# Patient Record
Sex: MALE | Race: WHITE | NOT HISPANIC OR LATINO | Employment: FULL TIME | URBAN - METROPOLITAN AREA
[De-identification: names, ages, dates, MRNs, and addresses within clinical notes are randomized per-mention and may not be internally consistent; named-entity substitution may affect disease eponyms.]

---

## 2020-05-01 ENCOUNTER — HOSPITAL ENCOUNTER (EMERGENCY)
Facility: HOSPITAL | Age: 45
Discharge: HOME | End: 2020-05-01
Attending: EMERGENCY MEDICINE | Admitting: EMERGENCY MEDICINE

## 2020-05-01 VITALS
OXYGEN SATURATION: 97 % | HEIGHT: 72 IN | RESPIRATION RATE: 17 BRPM | HEART RATE: 75 BPM | TEMPERATURE: 97.5 F | SYSTOLIC BLOOD PRESSURE: 151 MMHG | DIASTOLIC BLOOD PRESSURE: 95 MMHG

## 2020-05-01 DIAGNOSIS — R60.0 PERIORBITAL EDEMA OF RIGHT EYE: Primary | ICD-10-CM

## 2020-05-01 PROCEDURE — 99282 EMERGENCY DEPT VISIT SF MDM: CPT

## 2020-05-01 NOTE — ED PROVIDER NOTES
EMERGENCY DEPARTMENT ENCOUNTER    Room Number:  04/04  Date seen:  5/1/2020  Time seen: 12:13 PM  PCP: Provider, No Known  Historian: patient      HPI:  Chief Complaint: right eye swelling    A complete HPI/ROS/PMH/PSH/SH/FH are unobtainable due to: none    Context: Morales Matamoros is a 44 y.o. male who presents to the ED for evaluation of swelling to the right eye area.  He states 1 week ago he developed an abscess/cellulitis to his right forehead.  He was seen at urgent care and placed on Keflex and Benadryl.  Since that time the abscess, which he states originally was 5 cm as measured by the urgent care, has nearly completely resolved.  Yesterday he started noticing some swelling to the right periorbital area.  It has gradually worsened over time and was worse when he woke up this morning.  Nothing he has noted specifically seems to make it worse or better.  He denies any pain in the right periorbital area as well as any change in vision, fevers or vomiting.  He is not diabetic.        PAST MEDICAL HISTORY  Active Ambulatory Problems     Diagnosis Date Noted   • No Active Ambulatory Problems     Resolved Ambulatory Problems     Diagnosis Date Noted   • No Resolved Ambulatory Problems     No Additional Past Medical History         PAST SURGICAL HISTORY  No past surgical history on file.      FAMILY HISTORY  No family history on file.      SOCIAL HISTORY  Social History     Socioeconomic History   • Marital status:      Spouse name: Not on file   • Number of children: Not on file   • Years of education: Not on file   • Highest education level: Not on file         ALLERGIES  Patient has no known allergies.        REVIEW OF SYSTEMS  Review of Systems   Constitutional: Negative for chills and fever.   HENT: Negative for sore throat.    Eyes: Negative for pain and visual disturbance.        + right eye swelling   Respiratory: Negative for cough and shortness of breath.    Cardiovascular: Negative for chest pain.    Gastrointestinal: Negative for abdominal pain.   Musculoskeletal: Negative.    Skin:        + resolving cellulitis to forehead   Neurological: Negative.  Negative for headaches.   Psychiatric/Behavioral: Negative.             PHYSICAL EXAM  ED Triage Vitals   Temp Heart Rate Resp BP SpO2   05/01/20 1128 05/01/20 1128 05/01/20 1128 05/01/20 1151 05/01/20 1128   97.5 °F (36.4 °C) 75 17 151/95 97 %      Temp src Heart Rate Source Patient Position BP Location FiO2 (%)   05/01/20 1128 -- -- 05/01/20 1151 --   Tympanic   Right arm          GENERAL: not distressed  HENT: atraumatic, normocephalic.  There is a 1 to 2 cm area of eschar to the right frontal area.  There are some small excoriated areas around that.  There is no  erythema, trace edema.  No induration, no fluctuance.    EYES: no scleral icterus. PERRL, EOMI. There is some moderate right periorbital edema without erythema.  No periorbital lesions.  No pain with extraocular movements.  CV: regular rhythm, regular rate  RESPIRATORY: normal effort  MUSCULOSKELETAL: no deformity  NEURO: alert, moves all extremities, follows commands  SKIN: warm, dry    Vital signs and nursing notes reviewed.              PROGRESS AND CONSULTS            Reviewed pt's history and workup with Dr. Guerra.  After a bedside evaluation; Dr Guerra agrees with the plan of care      Patient was placed in face mask in first look. Patient was wearing facemask each time I entered the room and throughout our encounter. I wore protective equipment throughout this patient encounter including a face mask, eye shield  and gloves. Hand hygiene was performed before donning protective equipment and after removal when leaving the room.      MEDICAL DECISION MAKING      MDM       The patient does have some right periorbital edema, though there is no sign of preseptal or periorbital abscess. There is no erythema, lesions, fluctuance, induration or drainage. At this time it appears this is dependent edema  from the abscess/cellulitis that was located on the forehead that is resolving and appears to be healing really well. I have recommended cool compresses, sleeping with his head propped up, and a scheduled antihistamine (he is taking benadryl). I believe his symptoms will improve over the next 2-3 days, but if worse or not improving he should return to the ER. The patient verbalizes understanding and is agreeable with this plan.     DIAGNOSIS  Final diagnoses:   Periorbital edema of right eye         DISPOSITION  Discharge        Latest Documented Vital Signs:  As of 12:13  BP- 151/95 HR- 75 Temp- 97.5 °F (36.4 °C) (Tympanic) O2 sat- 97%       Juana Reinoso PA  05/01/20 1659

## 2020-05-01 NOTE — ED PROVIDER NOTES
Brief history of present illness: 44-year-old male with right periorbital swelling that began yesterday evening and was more notable this morning.  He recently had treated a right forehead abscess closer to his crown that is resolving.  He denies any injury, pain or vision change.  No discharge from the eye.  No pain of the skin of the forehead or around the eye and no new rash.    Physical exam:   Alert appropriate, nontoxic.  Healing skin changes of the forehead consistent with prior cellulitis.  No rash to suggest zoster.  Mild to moderate periorbital edema without globe tenderness or pain with extraocular movement.  EOMI, PERRLA.    MDM: Recommend warm compresses, close observation for erythema, pain, discharge or vision change.  Patient agreeable with discharge plan.  Patient already taking antihistamine at home.    I have seen and personally evaluated this patient, discussed the case with the treating advanced practice provider, and reviewed their note. I was involved in the medical decision making during the evaluation, testing and disposition planning for this patient.     Bi Guerra MD  05/01/20 3432

## 2020-05-01 NOTE — DISCHARGE INSTRUCTIONS
Cool compresses to the right eye area every 4 hours for 15 minutes each.  Sleep with your head elevated.  Continue the Benadryl and antibiotic until completed.  Turn to the emergency department for any pain in the eye, change in vision, fevers, vomiting, redness, any concerns.    Recheck with a PCP in 2 days. If you do not have a primary care provider, you may call the [facility] number to help you establish one.

## 2020-05-01 NOTE — ED TRIAGE NOTES
"Pt seen at [facility] on Sunday for \"bug bite to right forehead\", states \"I didn't have a PCP to follow up with so they told me to follow up with the ER\", dx with cellulitis and began keflex. States right eye began swelling yesterday. Pt arrives aaox4, abc's intact, ambulatory with steady gait, NAD noted at this time. Pt placed in mask at triage. This RN also wearing a mask.    "

## 2025-04-16 ENCOUNTER — HOSPITAL ENCOUNTER (EMERGENCY)
Facility: HOSPITAL | Age: 50
Discharge: HOME | End: 2025-04-16
Attending: EMERGENCY MEDICINE | Admitting: EMERGENCY MEDICINE
Payer: COMMERCIAL

## 2025-04-16 VITALS
SYSTOLIC BLOOD PRESSURE: 191 MMHG | TEMPERATURE: 98 F | DIASTOLIC BLOOD PRESSURE: 108 MMHG | RESPIRATION RATE: 16 BRPM | WEIGHT: 235 LBS | HEART RATE: 68 BPM | OXYGEN SATURATION: 98 % | BODY MASS INDEX: 31.83 KG/M2 | HEIGHT: 72 IN

## 2025-04-16 DIAGNOSIS — M79.602 LEFT ARM PAIN: Primary | ICD-10-CM

## 2025-04-16 LAB
QT INTERVAL: 411 MS
QTC INTERVAL: 404 MS

## 2025-04-16 PROCEDURE — 93005 ELECTROCARDIOGRAM TRACING: CPT | Performed by: EMERGENCY MEDICINE

## 2025-04-16 PROCEDURE — 99283 EMERGENCY DEPT VISIT LOW MDM: CPT | Performed by: EMERGENCY MEDICINE

## 2025-04-16 PROCEDURE — 25010000002 KETOROLAC TROMETHAMINE PER 15 MG: Performed by: EMERGENCY MEDICINE

## 2025-04-16 PROCEDURE — 99283 EMERGENCY DEPT VISIT LOW MDM: CPT

## 2025-04-16 PROCEDURE — 93010 ELECTROCARDIOGRAM REPORT: CPT | Performed by: INTERNAL MEDICINE

## 2025-04-16 PROCEDURE — 96372 THER/PROPH/DIAG INJ SC/IM: CPT

## 2025-04-16 RX ORDER — KETOROLAC TROMETHAMINE 30 MG/ML
30 INJECTION, SOLUTION INTRAMUSCULAR; INTRAVENOUS ONCE
Status: COMPLETED | OUTPATIENT
Start: 2025-04-16 | End: 2025-04-16

## 2025-04-16 RX ORDER — KETOROLAC TROMETHAMINE 10 MG/1
10 TABLET, FILM COATED ORAL EVERY 6 HOURS PRN
Qty: 15 TABLET | Refills: 0 | Status: SHIPPED | OUTPATIENT
Start: 2025-04-16

## 2025-04-16 RX ORDER — METHOCARBAMOL 750 MG/1
750 TABLET, FILM COATED ORAL 3 TIMES DAILY PRN
Qty: 20 TABLET | Refills: 0 | Status: SHIPPED | OUTPATIENT
Start: 2025-04-16

## 2025-04-16 RX ADMIN — KETOROLAC TROMETHAMINE 30 MG: 30 INJECTION, SOLUTION INTRAMUSCULAR at 01:13

## 2025-04-16 NOTE — DISCHARGE INSTRUCTIONS
Use sling as needed for the next 48 hours to immobilize and rest your left arm.  You may remove sling for personal hygiene and at night to sleep.  Do some passive range of motion exercises with the left arm at night to stretch it out.  No use of left upper extremity x 48 hours, then activity as tolerated.  Take prescribed occasions as needed for pain relief.  Consider ice for additional pain relief.  Follow-up with your primary care physician or orthopedic specialist in 2 days for repeat evaluation.  As discussed, if anytime you start to develop chest pain, shortness of breath, nausea or dizziness-return to the ER for further workup.

## 2025-04-16 NOTE — FS[facility] PROVIDER NOTE
Subjective   History of Present Illness  Patient is a 49-year-old  male who presents emergency room with complaints of left arm pain.  He states that he has had his left arm pain for about the last day and a half.  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  He suspects that he injured it somehow doing this.  Patient states that his pain is made worse when he tries to use that left arm to steer his forklift.  Patient states that if he rests the arm, his pain is better.  He denies any chest pain whatsoever.  He denies shortness of breath, nausea or dizziness.  He reports taking ibuprofen with partial relief of his left arm pain.        Review of Systems   Constitutional: Negative.  Negative for chills, fatigue and fever.   Eyes: Negative.    Respiratory:  Negative for cough, chest tightness and shortness of breath.    Cardiovascular:  Negative for chest pain and palpitations.   Gastrointestinal:  Negative for abdominal pain, diarrhea, nausea and vomiting.   Genitourinary: Negative.    Musculoskeletal:  Positive for myalgias. Negative for arthralgias, gait problem and joint swelling.   Skin: Negative.  Negative for rash.   Neurological: Negative.  Negative for syncope, weakness, numbness and headaches.   Psychiatric/Behavioral: Negative.     All other systems reviewed and are negative.      History reviewed. No pertinent past medical history.    No Known Allergies    History reviewed. No pertinent surgical history.    History reviewed. No pertinent family history.    Social History     Socioeconomic History    Marital status:    Tobacco Use    Smoking status: Never   Substance and Sexual Activity    Alcohol use: Never    Drug use: Never           Objective   Physical Exam  Vitals and nursing note reviewed.   Constitutional:       General: He is not in acute distress.     Appearance: He is not ill-appearing.   HENT:      Head: Normocephalic.      Right Ear:  External ear normal.      Left Ear: External ear normal.      Nose: Nose normal.      Mouth/Throat:      Mouth: Mucous membranes are moist.   Eyes:      Extraocular Movements: Extraocular movements intact.   Cardiovascular:      Rate and Rhythm: Normal rate and regular rhythm.      Pulses: Normal pulses.   Pulmonary:      Effort: Pulmonary effort is normal. No respiratory distress.   Abdominal:      General: Abdomen is flat.   Musculoskeletal:         General: No swelling, deformity or signs of injury. Normal range of motion.      Left upper arm: Tenderness present. No swelling or bony tenderness.      Cervical back: No tenderness.      Comments: Left upper extremity pain is reproducible on palpation.   Skin:     General: Skin is warm.      Capillary Refill: Capillary refill takes less than 2 seconds.   Neurological:      General: No focal deficit present.      Mental Status: He is alert and oriented to person, place, and time. Mental status is at baseline.   Psychiatric:         Mood and Affect: Mood normal.         ECG 12 Lead      Date/Time: 4/16/2025 1:10 AM    Performed by: Serge Moore MD  Authorized by: Serge Moore MD  Comparison: not compared with previous ECG   Rhythm: sinus rhythm  Rate: normal  QRS axis: normal  Conduction: conduction normal  ST Segments: ST segments normal  T Waves: T waves normal  Other: no other findings  Clinical impression: normal ECG               ED Course  ED Course as of 04/16/25 0111   Wed Apr 16, 2025   0104 EKG is normal. [KZ]      ED Course User Index  [KZ] Serge Moore MD                                           Medical Decision Making  Patient presents emergency room with left upper extremity pain on examination he had reproducible tenderness.  EKG performed here is normal.  Suspect some sort of muscle or tendinous injury.  I did consider cardiac etiology, but the patient does not have any chest pain or associated symptoms.  His EKG is also normal.  Patient  will be treated symptomatically with pain medication and muscle relaxers.  He is also given a sling.  Advised to return if he develops any new symptoms or follow-up with orthopedic surgery if his symptoms persist.    Problems Addressed:  Left arm pain: complicated acute illness or injury    Amount and/or Complexity of Data Reviewed  ECG/medicine tests: ordered and independent interpretation performed. Decision-making details documented in ED Course.    Risk  Prescription drug management.        Final diagnoses:   Left arm pain       ED Disposition  ED Disposition       ED Disposition   Discharge    Condition   Stable    Comment   --               [facility]  [address]  [facility] [address]  [phone]  Schedule an appointment as soon as possible for a visit in 2 days  If symptoms persist         Medication List        New Prescriptions      ketorolac 10 MG tablet  Commonly known as: TORADOL  Take 1 tablet by mouth Every 6 (Six) Hours As Needed for Moderate Pain or Severe Pain.     methocarbamol 750 MG tablet  Commonly known as: ROBAXIN  Take 1 tablet by mouth 3 (Three) Times a Day As Needed for Muscle Spasms.               Where to Get Your Medications        These medications were sent to [facility] [address] [place] [address] ([place]), [place] - [address] [facility] - [phone]  - [phone] FX [address] [facility] ([facility] [place] [address]      Phone: [phone]   ketorolac 10 MG tablet  methocarbamol 750 MG tablet

## 2025-04-16 NOTE — Clinical Note
[facility]  [address]  [facility] [id number]    Moraels Matamoros was seen and treated in our emergency department on 4/16/2025.  He may return to work on 04/18/2025.         Thank you for choosing [facility].    Serge Moore MD